# Patient Record
Sex: MALE | Race: WHITE | Employment: FULL TIME | URBAN - METROPOLITAN AREA
[De-identification: names, ages, dates, MRNs, and addresses within clinical notes are randomized per-mention and may not be internally consistent; named-entity substitution may affect disease eponyms.]

---

## 2024-01-18 ENCOUNTER — OFFICE VISIT (OUTPATIENT)
Facility: CLINIC | Age: 29
End: 2024-01-18
Payer: COMMERCIAL

## 2024-01-18 VITALS
HEART RATE: 91 BPM | SYSTOLIC BLOOD PRESSURE: 162 MMHG | DIASTOLIC BLOOD PRESSURE: 81 MMHG | HEIGHT: 73 IN | BODY MASS INDEX: 29.55 KG/M2 | TEMPERATURE: 98 F | RESPIRATION RATE: 20 BRPM | WEIGHT: 223 LBS | OXYGEN SATURATION: 98 %

## 2024-01-18 DIAGNOSIS — M25.512 CHRONIC LEFT SHOULDER PAIN: ICD-10-CM

## 2024-01-18 DIAGNOSIS — G89.29 CHRONIC LEFT SHOULDER PAIN: ICD-10-CM

## 2024-01-18 DIAGNOSIS — F41.9 ANXIETY: ICD-10-CM

## 2024-01-18 DIAGNOSIS — F51.04 PSYCHOPHYSIOLOGICAL INSOMNIA: ICD-10-CM

## 2024-01-18 DIAGNOSIS — N52.1 ERECTILE DYSFUNCTION DUE TO DISEASES CLASSIFIED ELSEWHERE: ICD-10-CM

## 2024-01-18 DIAGNOSIS — M95.8 WINGED SCAPULA OF LEFT SIDE: Primary | ICD-10-CM

## 2024-01-18 DIAGNOSIS — M75.42 IMPINGEMENT SYNDROME OF LEFT SHOULDER: ICD-10-CM

## 2024-01-18 PROBLEM — M25.569 PAIN IN UNSPECIFIED KNEE: Status: ACTIVE | Noted: 2024-01-18

## 2024-01-18 PROBLEM — F51.9 NONORGANIC SLEEP DISORDER: Status: ACTIVE | Noted: 2024-01-18

## 2024-01-18 PROBLEM — F51.9 NONORGANIC SLEEP DISORDER: Status: RESOLVED | Noted: 2024-01-18 | Resolved: 2024-01-18

## 2024-01-18 PROBLEM — F10.20 ALCOHOL DEPENDENCE, UNCOMPLICATED (HCC): Status: ACTIVE | Noted: 2019-01-16

## 2024-01-18 PROBLEM — M25.561 ARTHRALGIA OF RIGHT KNEE: Status: ACTIVE | Noted: 2024-01-18

## 2024-01-18 PROBLEM — K40.91 UNILATERAL INGUINAL HERNIA, WITHOUT OBSTRUCTION OR GANGRENE, RECURRENT: Status: ACTIVE | Noted: 2024-01-18

## 2024-01-18 PROBLEM — G47.00 INSOMNIA, UNSPECIFIED: Status: ACTIVE | Noted: 2024-01-18

## 2024-01-18 PROCEDURE — G8427 DOCREV CUR MEDS BY ELIG CLIN: HCPCS | Performed by: FAMILY MEDICINE

## 2024-01-18 PROCEDURE — G8484 FLU IMMUNIZE NO ADMIN: HCPCS | Performed by: FAMILY MEDICINE

## 2024-01-18 PROCEDURE — G8419 CALC BMI OUT NRM PARAM NOF/U: HCPCS | Performed by: FAMILY MEDICINE

## 2024-01-18 PROCEDURE — 99204 OFFICE O/P NEW MOD 45 MIN: CPT | Performed by: FAMILY MEDICINE

## 2024-01-18 PROCEDURE — 4004F PT TOBACCO SCREEN RCVD TLK: CPT | Performed by: FAMILY MEDICINE

## 2024-01-18 RX ORDER — TRAZODONE HYDROCHLORIDE 100 MG/1
50-100 TABLET ORAL NIGHTLY
Qty: 90 TABLET | Refills: 0 | Status: SHIPPED | OUTPATIENT
Start: 2024-01-18

## 2024-01-18 ASSESSMENT — PATIENT HEALTH QUESTIONNAIRE - PHQ9
SUM OF ALL RESPONSES TO PHQ QUESTIONS 1-9: 2
SUM OF ALL RESPONSES TO PHQ QUESTIONS 1-9: 2
1. LITTLE INTEREST OR PLEASURE IN DOING THINGS: 1
SUM OF ALL RESPONSES TO PHQ QUESTIONS 1-9: 2
SUM OF ALL RESPONSES TO PHQ9 QUESTIONS 1 & 2: 2
SUM OF ALL RESPONSES TO PHQ QUESTIONS 1-9: 2
2. FEELING DOWN, DEPRESSED OR HOPELESS: 1

## 2024-01-18 NOTE — ASSESSMENT & PLAN NOTE
Not controlled, will work on combination of sleep and anxiety with trazodone 50 to 100 mg nightly.  Should help with erectile function a bit as well.

## 2024-01-18 NOTE — ASSESSMENT & PLAN NOTE
Uncontrolled, changes made today: trial trazodone 50-100mg nightly, meet back in 6 weeks to check on effects.

## 2024-01-18 NOTE — PATIENT INSTRUCTIONS
Suicide Hotline/Help:  Call or text 644    Websites for Online and Local Therapists:  PsychologyToday[employer]    For Psychology/Psychiatry, can call:  [facility]  [address]  [address]  [place]  [phone]    [facility] [place]  [address]  [phone]    [facility]   [phone] OR [phone]    [facility]   [address] [place] [address], [address]  [place], [place] [address]  PHONE [phone]  FAX [phone]    [facility] Services:  Crisis Intervention   call anytime  [phone]    Mental Health Support Services  Address  [facility] [place] [address]    Phone: [phone]  Fax: [phone]    Hours  Monday 8 a.m. - 5 p.m.  Tuesday - Thursday 8 a.m. - 9 p.m.  Friday 8 a.m. - 5 p.m.

## 2024-01-18 NOTE — PROGRESS NOTES
Chief Complaint   Patient presents with    New Patient     Np to practice concerns for trouble falling asleep and standing asleep 2-3 hours a night. Anxiety issues and ED. Pending sale to [facility] is  pretty healthy.

## 2024-01-18 NOTE — PROGRESS NOTES
Family Medicine Initial Office Visit  Patient: William Davis  1995, 28 y.o., male  Encounter Date: 1/18/2024    ASSESSMENT & PLAN  William Davis is a 28 y.o. male  who presented for established care with below concerns:    1. Winged scapula of left side  Assessment & Plan:  Borderline controlled, would recommend considering doing PT again.  Orders:  -     [facility] - Physical Therapy at [facility]  2. Impingement syndrome of left shoulder  Assessment & Plan:   Uncontrolled, recommend getting back into PT. If not improving talk with ortho.  Orders:  -     [facility] - Physical Therapy at [facility]  3. Chronic left shoulder pain  -     [facility] Therapy at [facility]  4. Psychophysiological insomnia  Assessment & Plan:   Uncontrolled, changes made today: trial trazodone 50-100mg nightly, meet back in 6 weeks to check on effects.    The following orders have not been finalized:  Orders:  -     traZODone (DESYREL) 100 MG tablet  5. Anxiety  Assessment & Plan:  Not controlled, will work on combination of sleep and anxiety with trazodone 50 to 100 mg nightly.  Should help with erectile function a bit as well.  6. Erectile dysfunction due to diseases classified elsewhere  Assessment & Plan:  Trazodone for sleep and anxiety, should help with erection as well.        Return in about 6 weeks (around 2/29/2024) for mood, sleep management.    Patient Instructions   Suicide Hotline/Help:  Call or text 970    Websites for Online and Local Therapists:  [employer]    For Psychology/Psychiatry, can call:  [facility]  [address]  [address]  [place]  [phone]    [facility]  [address]  [place]  [phone]    [facility]   [phone] OR [phone]    [place] [id number], [address]  [place], [place] [address]  PHONE [phone])

## 2024-02-16 DIAGNOSIS — F41.9 ANXIETY: ICD-10-CM

## 2024-02-16 DIAGNOSIS — N52.1 ERECTILE DYSFUNCTION DUE TO DISEASES CLASSIFIED ELSEWHERE: ICD-10-CM

## 2024-02-16 LAB
ALBUMIN SERPL-MCNC: 3.8 G/DL (ref 3.5–5)
ALBUMIN/GLOB SERPL: 1.3 (ref 1.1–2.2)
ALP SERPL-CCNC: 80 U/L (ref 45–117)
ALT SERPL-CCNC: 68 U/L (ref 12–78)
ANION GAP SERPL CALC-SCNC: 2 MMOL/L (ref 5–15)
AST SERPL-CCNC: 29 U/L (ref 15–37)
BILIRUB SERPL-MCNC: 0.5 MG/DL (ref 0.2–1)
BUN SERPL-MCNC: 12 MG/DL (ref 6–20)
BUN/CREAT SERPL: 12 (ref 12–20)
CALCIUM SERPL-MCNC: 9.5 MG/DL (ref 8.5–10.1)
CHLORIDE SERPL-SCNC: 108 MMOL/L (ref 97–108)
CHOLEST SERPL-MCNC: 145 MG/DL
CO2 SERPL-SCNC: 30 MMOL/L (ref 21–32)
CREAT SERPL-MCNC: 0.99 MG/DL (ref 0.7–1.3)
ERYTHROCYTE [DISTWIDTH] IN BLOOD BY AUTOMATED COUNT: 11.9 % (ref 11.5–14.5)
EST. AVERAGE GLUCOSE BLD GHB EST-MCNC: 97 MG/DL
GLOBULIN SER CALC-MCNC: 3 G/DL (ref 2–4)
GLUCOSE SERPL-MCNC: 90 MG/DL (ref 65–100)
HBA1C MFR BLD: 5 % (ref 4–5.6)
HCT VFR BLD AUTO: 48.4 % (ref 36.6–50.3)
HDLC SERPL-MCNC: 29 MG/DL
HDLC SERPL: 5 (ref 0–5)
HGB BLD-MCNC: 16.6 G/DL (ref 12.1–17)
LDLC SERPL CALC-MCNC: 86.4 MG/DL (ref 0–100)
MCH RBC QN AUTO: 30.5 PG (ref 26–34)
MCHC RBC AUTO-ENTMCNC: 34.3 G/DL (ref 30–36.5)
MCV RBC AUTO: 88.8 FL (ref 80–99)
NRBC # BLD: 0 K/UL (ref 0–0.01)
NRBC BLD-RTO: 0 PER 100 WBC
PLATELET # BLD AUTO: 341 K/UL (ref 150–400)
PMV BLD AUTO: 10.1 FL (ref 8.9–12.9)
POTASSIUM SERPL-SCNC: 4.8 MMOL/L (ref 3.5–5.1)
PROT SERPL-MCNC: 6.8 G/DL (ref 6.4–8.2)
RBC # BLD AUTO: 5.45 M/UL (ref 4.1–5.7)
SODIUM SERPL-SCNC: 140 MMOL/L (ref 136–145)
TRIGL SERPL-MCNC: 148 MG/DL
VLDLC SERPL CALC-MCNC: 29.6 MG/DL
WBC # BLD AUTO: 4.7 K/UL (ref 4.1–11.1)

## 2024-02-17 LAB — TSH SERPL DL<=0.05 MIU/L-ACNC: 1.35 UIU/ML (ref 0.45–4.5)

## 2024-02-29 ENCOUNTER — OFFICE VISIT (OUTPATIENT)
Facility: CLINIC | Age: 29
End: 2024-02-29
Payer: COMMERCIAL

## 2024-02-29 VITALS
HEIGHT: 73 IN | RESPIRATION RATE: 20 BRPM | SYSTOLIC BLOOD PRESSURE: 147 MMHG | DIASTOLIC BLOOD PRESSURE: 90 MMHG | HEART RATE: 96 BPM | WEIGHT: 221 LBS | BODY MASS INDEX: 29.29 KG/M2 | TEMPERATURE: 97 F | OXYGEN SATURATION: 98 %

## 2024-02-29 DIAGNOSIS — M75.42 IMPINGEMENT SYNDROME OF LEFT SHOULDER: ICD-10-CM

## 2024-02-29 DIAGNOSIS — F41.9 ANXIETY: ICD-10-CM

## 2024-02-29 DIAGNOSIS — F10.21 HISTORY OF ALCOHOL DEPENDENCE (HCC): ICD-10-CM

## 2024-02-29 DIAGNOSIS — N52.1 ERECTILE DYSFUNCTION DUE TO DISEASES CLASSIFIED ELSEWHERE: ICD-10-CM

## 2024-02-29 DIAGNOSIS — F51.04 PSYCHOPHYSIOLOGICAL INSOMNIA: Primary | ICD-10-CM

## 2024-02-29 PROCEDURE — G8427 DOCREV CUR MEDS BY ELIG CLIN: HCPCS | Performed by: FAMILY MEDICINE

## 2024-02-29 PROCEDURE — 99214 OFFICE O/P EST MOD 30 MIN: CPT | Performed by: FAMILY MEDICINE

## 2024-02-29 PROCEDURE — G8484 FLU IMMUNIZE NO ADMIN: HCPCS | Performed by: FAMILY MEDICINE

## 2024-02-29 PROCEDURE — 4004F PT TOBACCO SCREEN RCVD TLK: CPT | Performed by: FAMILY MEDICINE

## 2024-02-29 PROCEDURE — G8419 CALC BMI OUT NRM PARAM NOF/U: HCPCS | Performed by: FAMILY MEDICINE

## 2024-02-29 RX ORDER — SILDENAFIL 50 MG/1
25-50 TABLET, FILM COATED ORAL PRN
Qty: 30 TABLET | Refills: 3 | Status: SHIPPED | OUTPATIENT
Start: 2024-02-29

## 2024-02-29 ASSESSMENT — PATIENT HEALTH QUESTIONNAIRE - PHQ9
SUM OF ALL RESPONSES TO PHQ9 QUESTIONS 1 & 2: 0
SUM OF ALL RESPONSES TO PHQ QUESTIONS 1-9: 0
SUM OF ALL RESPONSES TO PHQ QUESTIONS 1-9: 0
1. LITTLE INTEREST OR PLEASURE IN DOING THINGS: 0
SUM OF ALL RESPONSES TO PHQ QUESTIONS 1-9: 0
2. FEELING DOWN, DEPRESSED OR HOPELESS: 0
SUM OF ALL RESPONSES TO PHQ QUESTIONS 1-9: 0

## 2024-02-29 NOTE — PROGRESS NOTES
Chief Complaint   Patient presents with    Follow-up     6 wk fu sleep has gotten significantly better trazadone is working well.
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
## 2024-02-29 NOTE — ASSESSMENT & PLAN NOTE
Borderline controlled, continue current medications and continue current treatment plan. Work on shoulder pain.

## 2024-07-19 ENCOUNTER — PATIENT MESSAGE (OUTPATIENT)
Facility: CLINIC | Age: 29
End: 2024-07-19

## 2024-07-19 DIAGNOSIS — M25.561 ARTHRALGIA OF RIGHT KNEE: Primary | ICD-10-CM

## 2024-07-19 DIAGNOSIS — M75.42 IMPINGEMENT SYNDROME OF LEFT SHOULDER: ICD-10-CM

## 2024-07-19 DIAGNOSIS — M25.569 KNEE PAIN, UNSPECIFIED CHRONICITY, UNSPECIFIED LATERALITY: ICD-10-CM

## 2024-07-22 RX ORDER — MELOXICAM 15 MG/1
7.5-15 TABLET ORAL DAILY
Qty: 30 TABLET | Refills: 1 | Status: SHIPPED | OUTPATIENT
Start: 2024-07-22
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
## 2024-08-30 ENCOUNTER — TELEPHONE (OUTPATIENT)
Facility: CLINIC | Age: 29
End: 2024-08-30

## 2024-08-30 NOTE — TELEPHONE ENCOUNTER
----- Message from Princess LANCASTER sent at 8/29/2024  3:02 PM EDT -----  Regarding: [facility] Appointment Request  [facility] Appointment Request    Patient needs appointment for [facility] Appointment Type: Existing Condition Follow Up.    Patient Requested Dates(s): September 3, 2024   Patient Requested Time: 10 AM   Provider Name: Hoang Nelson MD    Reason for Appointment Request: Other Patient calling wanting to switch his office visit to be a virtual one instead still same date and time on September 3, 2024 at 10 AM  --------------------------------------------------------------------------------------------------------------------------    Relationship to Patient: Self     Call Back Information: OK to leave message on voicemail  Preferred Call Back Number: Phone [phone]

## 2025-02-18 ENCOUNTER — OFFICE VISIT (OUTPATIENT)
Facility: CLINIC | Age: 30
End: 2025-02-18
Payer: COMMERCIAL

## 2025-02-18 VITALS
WEIGHT: 189.6 LBS | HEART RATE: 94 BPM | HEIGHT: 73 IN | BODY MASS INDEX: 25.13 KG/M2 | OXYGEN SATURATION: 97 % | TEMPERATURE: 98.2 F | SYSTOLIC BLOOD PRESSURE: 125 MMHG | DIASTOLIC BLOOD PRESSURE: 88 MMHG | RESPIRATION RATE: 18 BRPM

## 2025-02-18 DIAGNOSIS — N53.19 DISORDER OF EJACULATION: ICD-10-CM

## 2025-02-18 DIAGNOSIS — F41.9 ANXIETY: ICD-10-CM

## 2025-02-18 DIAGNOSIS — N52.1 ERECTILE DYSFUNCTION DUE TO DISEASES CLASSIFIED ELSEWHERE: Primary | ICD-10-CM

## 2025-02-18 DIAGNOSIS — A69.20 LYME DISEASE: ICD-10-CM

## 2025-02-18 DIAGNOSIS — E66.3 OVERWEIGHT (BMI 25.0-29.9): ICD-10-CM

## 2025-02-18 DIAGNOSIS — M62.81 MUSCLE WEAKNESS (GENERALIZED): ICD-10-CM

## 2025-02-18 DIAGNOSIS — F51.04 PSYCHOPHYSIOLOGICAL INSOMNIA: ICD-10-CM

## 2025-02-18 DIAGNOSIS — R53.83 FATIGUE, UNSPECIFIED TYPE: ICD-10-CM

## 2025-02-18 DIAGNOSIS — F10.21 HISTORY OF ALCOHOL DEPENDENCE (HCC): ICD-10-CM

## 2025-02-18 PROCEDURE — 99215 OFFICE O/P EST HI 40 MIN: CPT | Performed by: FAMILY MEDICINE

## 2025-02-18 PROCEDURE — G8427 DOCREV CUR MEDS BY ELIG CLIN: HCPCS | Performed by: FAMILY MEDICINE

## 2025-02-18 PROCEDURE — 4004F PT TOBACCO SCREEN RCVD TLK: CPT | Performed by: FAMILY MEDICINE

## 2025-02-18 PROCEDURE — G8419 CALC BMI OUT NRM PARAM NOF/U: HCPCS | Performed by: FAMILY MEDICINE

## 2025-02-18 RX ORDER — TRAZODONE HYDROCHLORIDE 50 MG/1
50 TABLET ORAL NIGHTLY
Qty: 90 TABLET | Refills: 1 | Status: SHIPPED | OUTPATIENT
Start: 2025-02-18

## 2025-02-18 RX ORDER — DOXYCYCLINE HYCLATE 100 MG
100 TABLET ORAL 2 TIMES DAILY
Qty: 60 TABLET | Refills: 0 | Status: SHIPPED | OUTPATIENT
Start: 2025-02-18 | End: 2025-03-20

## 2025-02-18 RX ORDER — SILDENAFIL 50 MG/1
25-50 TABLET, FILM COATED ORAL PRN
Qty: 30 TABLET | Refills: 3 | Status: SHIPPED | OUTPATIENT
Start: 2025-02-18

## 2025-02-18 SDOH — ECONOMIC STABILITY: FOOD INSECURITY: WITHIN THE PAST 12 MONTHS, YOU WORRIED THAT YOUR FOOD WOULD RUN OUT BEFORE YOU GOT MONEY TO BUY MORE.: NEVER TRUE

## 2025-02-18 SDOH — ECONOMIC STABILITY: INCOME INSECURITY: IN THE LAST 12 MONTHS, WAS THERE A TIME WHEN YOU WERE NOT ABLE TO PAY THE MORTGAGE OR RENT ON TIME?: NO

## 2025-02-18 SDOH — ECONOMIC STABILITY: FOOD INSECURITY: WITHIN THE PAST 12 MONTHS, THE FOOD YOU BOUGHT JUST DIDN'T LAST AND YOU DIDN'T HAVE MONEY TO GET MORE.: NEVER TRUE

## 2025-02-18 SDOH — ECONOMIC STABILITY: TRANSPORTATION INSECURITY
IN THE PAST 12 MONTHS, HAS LACK OF TRANSPORTATION KEPT YOU FROM MEETINGS, WORK, OR FROM GETTING THINGS NEEDED FOR DAILY LIVING?: NO

## 2025-02-18 SDOH — ECONOMIC STABILITY: TRANSPORTATION INSECURITY
IN THE PAST 12 MONTHS, HAS THE LACK OF TRANSPORTATION KEPT YOU FROM MEDICAL APPOINTMENTS OR FROM GETTING MEDICATIONS?: NO

## 2025-02-18 ASSESSMENT — PATIENT HEALTH QUESTIONNAIRE - PHQ9
1. LITTLE INTEREST OR PLEASURE IN DOING THINGS: NOT AT ALL
SUM OF ALL RESPONSES TO PHQ QUESTIONS 1-9: 0
SUM OF ALL RESPONSES TO PHQ QUESTIONS 1-9: 0
SUM OF ALL RESPONSES TO PHQ9 QUESTIONS 1 & 2: 0
SUM OF ALL RESPONSES TO PHQ QUESTIONS 1-9: 0
2. FEELING DOWN, DEPRESSED OR HOPELESS: NOT AT ALL
SUM OF ALL RESPONSES TO PHQ QUESTIONS 1-9: 0

## 2025-02-18 NOTE — PROGRESS NOTES
Assessment & Plan  1. Generalized muscle weakness: Evaluate testosterone levels due to symptoms of erectile dysfunction, fatigue, and ejaculation difficulties. Libido remains robust, suggesting low testosterone may not be the primary cause. Rash on legs and muscle weakness suggest possible autoimmune disease. ALT and AST levels normal. Given history of Lyme disease, further investigation warranted.  - Comprehensive lab workup including CK, aldolase, RAND, and Lyme disease tests  - Prescribed doxycycline 100 mg twice daily for one month, contingent on lab results  - If labs are normal, consider referral to a neurologist    2. Anxiety: Well-managed without medication.  - Encourage continued spiritual exploration and support group participation    3. Sleep onset insomnia:   - Refill trazodone prescription, dosage reduced to 50 mg    4. Erectile dysfunction: Uses Viagra sparingly (2-3 times/month) with satisfaction.  - Refill provided    Follow-up  - Return to lab for comprehensive testing  - Monitor response to doxycycline treatment    It was a pleasure seeing Mr. William Davis today.  Return in about 3 months (around 5/18/2025) for muscle weakness and ejaculation troubles.    History of Present Illness  The patient is a 30-year-old male presenting for a 1-year follow-up evaluation.    Anorgasmia  - Experiencing anorgasmia for the past 1.5 to 2 months, despite maintaining normal libido.  - Ejaculated twice in the past 2 months, noting a slightly clearer ejaculate.  - Denies any urinary symptoms or constipation.  - Made dietary improvements, including increased fish consumption, and achieved weight loss from 223 to 189 pounds.  - Reports abstinence from alcohol and denies the use of illicit substances or supplements.  - Uses sildenafil (Viagra) sparingly, approximately 2-3 times per month, and expresses satisfaction with his sexual relationship.  - Requests refills for sildenafil and trazodone.    Decreased Exercise

## 2025-02-18 NOTE — PROGRESS NOTES
Chief Complaint   Patient presents with    Follow-up     Patient states that his sleep pattern has improved.    Cough    Sinus Problem     1. Have you been to the ER, urgent care clinic since your last visit?  Hospitalized since your last visit?No    2. Have you seen or consulted any other health care providers outside of the [facility] since your last visit?  Include any pap smears or colon screening. No

## 2025-02-19 DIAGNOSIS — R53.83 FATIGUE, UNSPECIFIED TYPE: ICD-10-CM

## 2025-02-19 DIAGNOSIS — M62.81 MUSCLE WEAKNESS (GENERALIZED): ICD-10-CM

## 2025-02-19 DIAGNOSIS — N53.19 DISORDER OF EJACULATION: ICD-10-CM

## 2025-02-19 DIAGNOSIS — A69.20 LYME DISEASE: ICD-10-CM

## 2025-02-19 LAB
CK SERPL-CCNC: 203 U/L (ref 39–308)
CRP SERPL-MCNC: <0.29 MG/DL (ref 0–0.3)
ERYTHROCYTE [SEDIMENTATION RATE] IN BLOOD: 2 MM/HR (ref 0–15)
LDH SERPL L TO P-CCNC: 164 U/L (ref 85–241)

## 2025-02-20 ENCOUNTER — PATIENT MESSAGE (OUTPATIENT)
Facility: CLINIC | Age: 30
End: 2025-02-20

## 2025-02-20 LAB
ALDOLASE SERPL-CCNC: 5.3 U/L (ref 3.3–10.3)
LYME ANTIBODY: NEGATIVE

## 2025-02-22 LAB — ANA SER QL: NEGATIVE

## 2025-02-23 LAB
TESTOST FREE SERPL-MCNC: 12.3 PG/ML (ref 8.7–25.1)
TESTOST SERPL-MCNC: 461 NG/DL (ref 264–916)

## 2025-02-25 ENCOUNTER — PATIENT MESSAGE (OUTPATIENT)
Facility: CLINIC | Age: 30
End: 2025-02-25

## 2025-02-25 DIAGNOSIS — M62.81 PROXIMAL MUSCLE WEAKNESS: Primary | ICD-10-CM

## 2025-05-06 ENCOUNTER — OFFICE VISIT (OUTPATIENT)
Facility: CLINIC | Age: 30
End: 2025-05-06
Payer: COMMERCIAL

## 2025-05-06 VITALS
HEART RATE: 81 BPM | WEIGHT: 191 LBS | RESPIRATION RATE: 16 BRPM | BODY MASS INDEX: 25.31 KG/M2 | TEMPERATURE: 97.2 F | OXYGEN SATURATION: 97 % | HEIGHT: 73 IN | DIASTOLIC BLOOD PRESSURE: 82 MMHG | SYSTOLIC BLOOD PRESSURE: 126 MMHG

## 2025-05-06 DIAGNOSIS — F51.04 PSYCHOPHYSIOLOGICAL INSOMNIA: ICD-10-CM

## 2025-05-06 DIAGNOSIS — M25.551 CHRONIC HIP PAIN, BILATERAL: ICD-10-CM

## 2025-05-06 DIAGNOSIS — M25.552 CHRONIC HIP PAIN, BILATERAL: ICD-10-CM

## 2025-05-06 DIAGNOSIS — G89.29 CHRONIC HIP PAIN, BILATERAL: ICD-10-CM

## 2025-05-06 DIAGNOSIS — E66.3 OVERWEIGHT (BMI 25.0-29.9): Primary | ICD-10-CM

## 2025-05-06 PROCEDURE — G8427 DOCREV CUR MEDS BY ELIG CLIN: HCPCS | Performed by: FAMILY MEDICINE

## 2025-05-06 PROCEDURE — 1036F TOBACCO NON-USER: CPT | Performed by: FAMILY MEDICINE

## 2025-05-06 PROCEDURE — G8419 CALC BMI OUT NRM PARAM NOF/U: HCPCS | Performed by: FAMILY MEDICINE

## 2025-05-06 PROCEDURE — 99214 OFFICE O/P EST MOD 30 MIN: CPT | Performed by: FAMILY MEDICINE

## 2025-05-06 NOTE — PROGRESS NOTES
Chief Complaint   Patient presents with    Other     Muscle weakness recently when he works out.  20 pound decrease.  Worked up for lymes dz. . Noticed since last visit. And significant los in gym.

## 2025-05-06 NOTE — PROGRESS NOTES
Assessment & Plan  1. Chronic bilateral hip pain: Intermittent, associated with weakness and occasional popping sounds.  - Order bilateral pelvic and hip x-ray to assess for structural abnormalities or early-onset arthritis.  - Advise hip exercises.  - If x-ray results are inconclusive and symptoms persist, consider EMG study.    2. Generalized muscle weakness: May be related to deconditioning or secondary to chronic hip pain. No evidence of muscle atrophy or autoimmune markers.  - Advise monitoring symptoms and gradually increasing physical activity, focusing on hip exercises.  - If no improvement or symptoms worsen, consider EMG study.    3. Anxiety: Well-managed.  - Continue monitoring anxiety levels and current management plan.    4. Insomnia: Successfully weaned off trazodone over past 1.5-2 months.  - Improved sleep quality and consistent sleep schedule.  - Continue monitoring sleep habits and current sleep hygiene practices.    Follow-up in 6 months.    It was a pleasure seeing Mr. William Davis today.  No follow-ups on file.    History of Present Illness  The patient presents for a follow-up regarding chronic health issues.    Generalized Muscle Weakness  - The patient reports generalized muscle weakness, which is attributed to a previous tick bite.  - Although muscle endurance has shown improvement, overall muscle strength remains suboptimal.  - The patient avoids dietary supplements due to experiencing facial paresthesia.  - The patient reports numbness in the left fifth toe and occasional scaly rashes affecting the toes, feet, and legs.  - There is a noted decrease in exercise capacity, with severe muscle spasms occurring post-exercise.  - Previously, the patient was able to engage in physical activity for 1.5 hours, but currently manages only 20-30 minutes due to fatigue, with efforts to extend this duration to 30-45 minutes.    Hip Weakness and Pain  - The patient noticed hip weakness after